# Patient Record
Sex: MALE | Race: BLACK OR AFRICAN AMERICAN | Employment: UNEMPLOYED | URBAN - METROPOLITAN AREA
[De-identification: names, ages, dates, MRNs, and addresses within clinical notes are randomized per-mention and may not be internally consistent; named-entity substitution may affect disease eponyms.]

---

## 2021-02-17 ENCOUNTER — APPOINTMENT (OUTPATIENT)
Dept: CT IMAGING | Facility: HOSPITAL | Age: 31
DRG: 897 | End: 2021-02-17
Attending: EMERGENCY MEDICINE

## 2021-02-17 ENCOUNTER — APPOINTMENT (OUTPATIENT)
Dept: GENERAL RADIOLOGY | Facility: HOSPITAL | Age: 31
DRG: 897 | End: 2021-02-17
Attending: EMERGENCY MEDICINE

## 2021-02-17 PROBLEM — F10.239 ALCOHOL WITHDRAWAL SEIZURE (HCC): Status: ACTIVE | Noted: 2021-02-17

## 2021-02-17 PROBLEM — R56.9 ALCOHOL WITHDRAWAL SEIZURE (HCC): Status: ACTIVE | Noted: 2021-02-17

## 2021-02-17 PROBLEM — R56.9 ALCOHOL WITHDRAWAL SEIZURE WITHOUT COMPLICATION (HCC): Status: ACTIVE | Noted: 2021-02-17

## 2021-02-17 PROBLEM — R00.0 TACHYCARDIA: Status: ACTIVE | Noted: 2021-02-17

## 2021-02-17 PROBLEM — F10.230 ALCOHOL WITHDRAWAL SEIZURE WITHOUT COMPLICATION (HCC): Status: ACTIVE | Noted: 2021-02-17

## 2021-02-17 PROBLEM — E87.2 METABOLIC ACIDOSIS: Status: ACTIVE | Noted: 2021-02-17

## 2021-02-17 PROCEDURE — 71045 X-RAY EXAM CHEST 1 VIEW: CPT | Performed by: EMERGENCY MEDICINE

## 2021-02-17 PROCEDURE — 70450 CT HEAD/BRAIN W/O DYE: CPT | Performed by: EMERGENCY MEDICINE

## 2021-02-17 NOTE — ED PROVIDER NOTES
Patient Seen in: [facility] AND [facility] Emergency Department    History   Patient presents with:  Arrythmia/Palpitations  Eval-D  Seizure Disorder      HPI    25-year-old male presents the ER status post seizure.   Patient presents the ER alert oriented x3 but s worn throughout the duration of the exam.  Handwashing was performed prior to and after the exam.  Stethoscope and any equipment used during my examination was cleaned with super sani-cloth germicidal wipes following the exam.     Physical Exam   Constitut PLATELET.   Procedure                               Abnormality         Status                     ---------                               -----------         ------                     CBC W/ DIFFERENTIAL[[id number]]                              Final resul 153/91 (!) 148/92   Pulse: (!) 152 (!) 141 (!) 139 (!) 134   Resp: 18 17 21 19   Temp:       TempSrc:       SpO2: 97% 96% 98% 98%   Weight:       Height:         *I personally reviewed and interpreted all ED vitals.   I also personally reviewed all labs and

## 2021-02-17 NOTE — ED NOTES
rec'd care of pt from ems, sitting up on cart, awake and alert and oriented x4. Family witnessed seizure like activity pta and called 911. Drinks etoh daily and stopped drinking 2 days ago. Tachycardic, rate 160.  Ems gave 2 doses of adenosine pta with no c

## 2021-02-17 NOTE — ED NOTES
Orders for admission, patient is aware of plan and ready to go upstairs. Any questions, please call ED JASS colby  at extension [phone].    Type of COVID test sent:rapid, negative  COVID Suspicion level: Low  Titratable drug(s) infusing:ns 1L/hr  Rate:    LOC

## 2021-02-17 NOTE — H&P
[facility]    PATIENT'S NAME: David Daniel   ATTENDING PHYSICIAN: Saeed Humphrey DO   PATIENT ACCOUNT#:   [de-identified]    LOCATION:  [place] [address]  MEDICAL RECORD #:   [id number]       YOB: 1990  ADMISSION DATE:       02/17 air.  HEENT:  Atraumatic, oropharynx clear. Injected conjunctivae bilaterally. Pupils equal, round, reactive. NECK:  Supple. No lymphadenopathy. Trachea midline. Full range of motion. LUNGS:  Clear to auscultation bilaterally.   Normal respiratory ef

## 2021-02-17 NOTE — ED INITIAL ASSESSMENT (HPI)
History of etoh abuse. Last drink 2 days ago. Seizure activity at home, fell to ground. Denies injury. Awake and answering questions appropriately at this time.  Hr 180 for ems, adenosine given x2 in route to hospital with no decrease in hr. +fever

## 2021-02-18 NOTE — PROGRESS NOTES
[facility]    Progress Note    Cherylene League Patient Status:  Inpatient    1990 MRN [id number]   Location [facility] 3W/SW Attending Padmini Li MD   Hosp Day # 1 PCP No primary care provider on file.         Subject plan          Results:     Lab Results   Component Value Date    WBC 7.1 02/18/2021    HGB 12.2 (L) 02/18/2021    HCT 36.0 (L) 02/18/2021    PLT 66.0 (L) 02/18/2021    CREATSERUM 0.89 02/18/2021    BUN 7 02/18/2021     02/18/2021    K 3.3 (L) 02/18/2

## 2021-02-18 NOTE — CM/SW NOTE
Per chart review, pt has hx w/ ETOH abuse. Per pt, only drinks 1/2 cup of vodka a day. Pt's mother reports pt drinking 2 gallons of vodka over the course of 3 days (per ER MD note).      SW noticed pt currently has COVID 23 insurance listed meaning he is un

## 2021-02-18 NOTE — PLAN OF CARE
Zeenat Ridley was admitted from the ER accompanied by his mother. Per Zeenat Ridley, he has a history of daily alcohol use and his last drink was 2-3 days ago. He has IVF infusing at 125ml/hr. CIWA score was a 3 on admission.  Plan is to continue to trend CIWA scores and m

## 2021-02-18 NOTE — PLAN OF CARE
Patient slept well overnight, CIWA scores remain less than 4. Patient remains on IVF. Seizure and fall precautions remain in place. Will continue to monitor patient.        Problem: DRUG ABUSE/DETOX  Goal: Will have no detox symptoms and will verbalize plan complex needs related to functional status, cognitive ability or social support system  Outcome: Progressing

## 2021-02-19 ENCOUNTER — APPOINTMENT (OUTPATIENT)
Dept: CV DIAGNOSTICS | Facility: HOSPITAL | Age: 31
DRG: 897 | End: 2021-02-19
Attending: HOSPITALIST

## 2021-02-19 PROCEDURE — 93306 TTE W/DOPPLER COMPLETE: CPT | Performed by: HOSPITALIST

## 2021-02-19 NOTE — CONSULTS
[facility]    PATIENT'S NAME: Jay Rodgers   ATTENDING PHYSICIAN: Leonel Ni MD   CONSULTING PHYSICIAN: Peace Gilbert DO   PATIENT ACCOUNT#:   [de-identified]    LOCATION:  [address] [place] [address] #:   [id number]       [id number]  AS/

## 2021-02-19 NOTE — PROGRESS NOTES
[facility] - [facility]    Progress Note    Metta Manifold Patient Status:  Inpatient    1990 MRN [id number]   Location [facility] 3W/SW Attending Elsa Nuñez MD   [facility] Day # 2 PCP No primary care provider on file.         Subject and plan    Global A/P  - Tachycardia, will have cards placed on consult  - echo ordered  - Procal elevated to 0.73, strep swab pending  - will start IV rocephin 1g q24 for now  - will monitor.         Results:     Lab Results   Component Value Date    WBC

## 2021-02-19 NOTE — PLAN OF CARE
Chris Weller is feeling better today. He was complaining of a sore throat. Strep swab sent to lab. Awaiting results. CIWA score has been zero throughout the day. Possible discharge tomorrow.    Problem: Patient Centered Care  Goal: Patient preferences are identifi assessment.  - Educate pt/family on patient safety including physical limitations  - Instruct pt to call for assistance with activity based on assessment  - Modify environment to reduce risk of injury  - Provide assistive devices as appropriate  - Consider

## 2021-02-20 NOTE — PLAN OF CARE
Pt A&Ox4. Sinus tach up to 150s with activity. Cepacol lozenges given for throat pain. CIWA discontinued after score less than 6 for 24 hrs. IVF running at 125 ml/hr. Call light within reach.     Problem: Patient Centered Care  Goal: Patient preferences are assessment.  - Educate pt/family on patient safety including physical limitations  - Instruct pt to call for assistance with activity based on assessment  - Modify environment to reduce risk of injury  - Provide assistive devices as appropriate  - Consider

## 2021-02-20 NOTE — PLAN OF CARE
Celestine Jc is feeling much better today. He is going to go home on antibiotics and eye drops. Plan to follow up with cardiology in one week. Discharge medications and paperwork reviewed with both Celestine Jc and his mother, Deena Saravia, at the bedside.    Problem: Patient falls.  - [place] fall precautions as indicated by assessment.  - Educate pt/family on patient safety including physical limitations  - Instruct pt to call for assistance with activity based on assessment  - Modify environment to reduce risk of injury  -

## 2021-02-20 NOTE — PLAN OF CARE
Problem: Patient Centered Care  Goal: Patient preferences are identified and integrated in the patient's plan of care  Description: Interventions:  - What would you like us to know as we care for you? I live at home with my mom.   - Provide timely, comple of injury  - Provide assistive devices as appropriate  - Consider OT/PT consult to assist with strengthening/mobility  - Encourage toileting schedule  Outcome: Progressing     Problem: DISCHARGE PLANNING  Goal: Discharge to home or other facility with appr

## 2021-02-20 NOTE — DISCHARGE SUMMARY
Hospital Discharge Diagnoses: ETOH withdrawal seizures    Lace+ Score: 47  59-90 High Risk  29-58 Medium Risk  0-28   Low Risk.     TCM Follow-Up Recommendation:  LACE < 29: Low Risk of readmission after discharge from the hospital; Still recommend for TCM

## 2021-02-22 NOTE — DISCHARGE SUMMARY
[facility]    PATIENT'S NAME: Julee Velez   ATTENDING PHYSICIAN: Teresa Nagel MD   PATIENT ACCOUNT#:   [id number]    LOCATION:  [address] [place] [address] #:   [id number]       YOB: 1990  ADMISSION DATE:       02/17/20 EXAMINATION:    VITAL SIGNS:  Reviewed from the patient's chart and currently stable. GENERAL:  Patient lying in bed, appears to be in no acute distress at this time. A and O x3. HEENT:  Extraocular movements are intact.   Pupils equal, round, and reacti